# Patient Record
Sex: FEMALE | Race: WHITE | NOT HISPANIC OR LATINO | Employment: OTHER | URBAN - METROPOLITAN AREA
[De-identification: names, ages, dates, MRNs, and addresses within clinical notes are randomized per-mention and may not be internally consistent; named-entity substitution may affect disease eponyms.]

---

## 2017-10-26 PROBLEM — M51.36 BULGING OF INTERVERTEBRAL DISC BETWEEN L4 AND L5: Status: ACTIVE | Noted: 2017-10-26

## 2017-12-17 PROBLEM — M51.36 BULGING OF INTERVERTEBRAL DISC BETWEEN L4 AND L5: Status: RESOLVED | Noted: 2017-10-26 | Resolved: 2017-12-17

## 2019-10-02 PROBLEM — G93.40 ENCEPHALOPATHY: Status: ACTIVE | Noted: 2019-10-02

## 2019-10-02 PROBLEM — R47.01 EXPRESSIVE APHASIA: Status: ACTIVE | Noted: 2019-10-02

## 2019-10-02 PROBLEM — R41.82 ALTERED MENTAL STATUS: Status: ACTIVE | Noted: 2019-10-02

## 2019-10-15 PROBLEM — G93.40 ENCEPHALOPATHY: Status: RESOLVED | Noted: 2019-10-02 | Resolved: 2019-10-15

## 2019-10-15 PROBLEM — R47.01 EXPRESSIVE APHASIA: Status: RESOLVED | Noted: 2019-10-02 | Resolved: 2019-10-15

## 2020-05-25 PROBLEM — R26.81 GAIT INSTABILITY: Status: ACTIVE | Noted: 2020-05-25

## 2020-05-25 PROBLEM — R52 PAIN: Status: ACTIVE | Noted: 2020-05-25

## 2020-05-25 PROBLEM — F02.80 LATE ONSET ALZHEIMER'S DISEASE WITHOUT BEHAVIORAL DISTURBANCE: Status: ACTIVE | Noted: 2020-05-25

## 2020-05-25 PROBLEM — G30.1 LATE ONSET ALZHEIMER'S DISEASE WITHOUT BEHAVIORAL DISTURBANCE: Status: ACTIVE | Noted: 2020-05-25

## 2020-05-25 PROBLEM — S32.591A: Status: ACTIVE | Noted: 2020-05-25

## 2020-05-25 PROBLEM — S32.501A CLOSED NONDISPLACED FRACTURE OF RIGHT PUBIS: Status: ACTIVE | Noted: 2020-05-25

## 2020-05-25 PROBLEM — N30.00 ACUTE CYSTITIS WITHOUT HEMATURIA: Status: ACTIVE | Noted: 2020-05-25

## 2020-08-06 ENCOUNTER — DOCUMENT SCAN (OUTPATIENT)
Dept: HOME HEALTH SERVICES | Facility: HOSPITAL | Age: 85
End: 2020-08-06

## 2021-01-01 ENCOUNTER — LAB VISIT (OUTPATIENT)
Dept: FAMILY MEDICINE | Facility: CLINIC | Age: 86
End: 2021-01-01
Payer: OTHER GOVERNMENT

## 2021-01-01 ENCOUNTER — PATIENT MESSAGE (OUTPATIENT)
Dept: FAMILY MEDICINE | Facility: CLINIC | Age: 86
End: 2021-01-01

## 2021-01-01 DIAGNOSIS — Z20.822 ENCOUNTER FOR LABORATORY TESTING FOR COVID-19 VIRUS: ICD-10-CM

## 2021-01-01 LAB
SARS-COV-2 RNA RESP QL NAA+PROBE: NOT DETECTED
SARS-COV-2- CYCLE NUMBER: NORMAL

## 2021-01-01 PROCEDURE — U0003 INFECTIOUS AGENT DETECTION BY NUCLEIC ACID (DNA OR RNA); SEVERE ACUTE RESPIRATORY SYNDROME CORONAVIRUS 2 (SARS-COV-2) (CORONAVIRUS DISEASE [COVID-19]), AMPLIFIED PROBE TECHNIQUE, MAKING USE OF HIGH THROUGHPUT TECHNOLOGIES AS DESCRIBED BY CMS-2020-01-R: HCPCS | Performed by: FAMILY MEDICINE

## 2021-01-01 PROCEDURE — U0005 INFEC AGEN DETEC AMPLI PROBE: HCPCS | Performed by: FAMILY MEDICINE

## 2021-09-12 PROBLEM — H91.90 HARD OF HEARING: Status: ACTIVE | Noted: 2021-01-01

## 2021-09-12 PROBLEM — G93.41 ACUTE METABOLIC ENCEPHALOPATHY: Status: ACTIVE | Noted: 2021-01-01

## 2021-09-12 PROBLEM — N30.01 ACUTE CYSTITIS WITH HEMATURIA: Status: ACTIVE | Noted: 2021-01-01

## 2021-09-12 PROBLEM — R65.20 SEVERE SEPSIS: Status: ACTIVE | Noted: 2021-01-01

## 2021-09-12 PROBLEM — A41.9 SEVERE SEPSIS: Status: ACTIVE | Noted: 2021-01-01

## 2021-09-12 PROBLEM — F03.90 DEMENTIA WITHOUT BEHAVIORAL DISTURBANCE: Status: ACTIVE | Noted: 2021-01-01

## 2022-01-01 ENCOUNTER — TELEPHONE (OUTPATIENT)
Dept: NEUROLOGY | Facility: CLINIC | Age: 87
End: 2022-01-01
Payer: OTHER GOVERNMENT

## 2022-01-01 ENCOUNTER — TELEPHONE (OUTPATIENT)
Dept: REHABILITATION | Facility: HOSPITAL | Age: 87
End: 2022-01-01
Payer: OTHER GOVERNMENT

## 2022-01-01 DIAGNOSIS — S22.31XA CLOSED FRACTURE OF ONE RIB OF RIGHT SIDE, INITIAL ENCOUNTER: Primary | ICD-10-CM

## 2022-01-01 RX ORDER — TRAMADOL HYDROCHLORIDE 50 MG/1
50 TABLET ORAL EVERY 8 HOURS PRN
Qty: 21 TABLET | Refills: 0 | Status: SHIPPED | OUTPATIENT
Start: 2022-01-01 | End: 2022-01-01

## 2022-03-15 NOTE — TELEPHONE ENCOUNTER
Spoke to pt daughter Julita and notified her of results and of Tramadol rx. She verbalized understanding.

## 2022-03-15 NOTE — TELEPHONE ENCOUNTER
Discussed with radiologist. Addendum of CT shows rib fracture, poorly seen on x-ray. Will send short course of tramadol for pain relief.    addended the report for lumbar spine CT from 03/11/2022.  nondisplaced right posterior 11th rib fracture, acute or subacute, fits the timing of her fall. It is not well seen on the rib series from today.     Please call daughter to give information. Thank you.     [place] [employer] Monitoring Program reviewed. No drugs found.

## 2022-03-31 PROBLEM — J22 LOWER RESPIRATORY TRACT INFECTION: Status: ACTIVE | Noted: 2022-01-01

## 2022-03-31 PROBLEM — J18.9 PNEUMONIA DUE TO INFECTIOUS ORGANISM: Status: ACTIVE | Noted: 2022-01-01

## 2022-03-31 PROBLEM — J22 LOWER RESPIRATORY TRACT INFECTION: Status: RESOLVED | Noted: 2022-01-01 | Resolved: 2022-01-01

## 2022-05-01 PROBLEM — J98.11 ATELECTASIS: Status: ACTIVE | Noted: 2022-01-01

## 2022-05-01 PROBLEM — S22.050A COMPRESSION FRACTURE OF T6 VERTEBRA: Status: ACTIVE | Noted: 2022-01-01

## 2022-05-03 NOTE — TELEPHONE ENCOUNTER
Called patient to schedule Neurology appointment.  Spoke with her daughter Jluita Irene, who stated her mother is in the hospital and will call back if she needs to schedule.

## 2022-05-04 PROBLEM — Z51.5 COMFORT MEASURES ONLY STATUS: Status: ACTIVE | Noted: 2022-01-01
